# Patient Record
Sex: MALE | Race: BLACK OR AFRICAN AMERICAN | URBAN - METROPOLITAN AREA
[De-identification: names, ages, dates, MRNs, and addresses within clinical notes are randomized per-mention and may not be internally consistent; named-entity substitution may affect disease eponyms.]

---

## 2022-08-26 ENCOUNTER — OFFICE VISIT (OUTPATIENT)
Dept: URBAN - METROPOLITAN AREA CLINIC 51 | Facility: CLINIC | Age: 22
End: 2022-08-26
Payer: COMMERCIAL

## 2022-08-26 DIAGNOSIS — R51.9 HEADACHE: Primary | ICD-10-CM

## 2022-08-26 DIAGNOSIS — H43.313 VITREOUS MEMBRANES AND STRANDS, BILATERAL: ICD-10-CM

## 2022-08-26 DIAGNOSIS — H52.31 ANISOMETROPIA: ICD-10-CM

## 2022-08-26 DIAGNOSIS — H40.013 OPEN ANGLE WITH BORDERLINE FINDINGS, LOW RISK, BILATERAL: ICD-10-CM

## 2022-08-26 PROCEDURE — 99204 OFFICE O/P NEW MOD 45 MIN: CPT | Performed by: OPTOMETRIST

## 2022-08-26 PROCEDURE — 92134 CPTRZ OPH DX IMG PST SGM RTA: CPT | Performed by: OPTOMETRIST

## 2022-08-26 ASSESSMENT — INTRAOCULAR PRESSURE
OD: 14
OS: 17

## 2022-08-26 ASSESSMENT — KERATOMETRY
OS: 40.52
OD: 40.34

## 2022-08-26 ASSESSMENT — VISUAL ACUITY
OS: 20/20
OD: 20/25

## 2022-08-26 NOTE — IMPRESSION/PLAN
Impression: Headache: R51.9. *Pt reports he is getting tested for minor stroke *here to rule out any ocular etiology *Hx of anisometropia OD>>>OS Plan: A comprehensive eye examination today revealed no abnormal clinical findings today. Pupillary reflexes are normal, anterior segment examination is normal with no corneal staining, thinning, edema, infiltrate. The anterior chambers in both eyes are clear with no cells or flare and no synechiae or iris atrophy. Lenses are clear in both eyes and fundus examination revealed glaucoma suspicion for optic nerves, normal appearing macula and retinal vasculature and peripheral retina. Optical coherence tomography revealed normal macular morphology OU.

## 2022-08-26 NOTE — IMPRESSION/PLAN
Impression: Anisometropia: H52.31.
*most likely with refractive amblyopia Plan: Current specs are new.  Will recheck at glaucoma work up to determine high myopic shift

## 2022-08-26 NOTE — IMPRESSION/PLAN
Impression: Open angle with borderline findings, low risk, bilateral: H40.013. *IOPs today 14mmHg OD and 17mmHg OS without medication Plan: Based on Michael Valle 74 appearance. Pt discussion regarding diagnosis. Exp'd that it is a symptomless disease. Return to clinic for work up including HVF24-2, IOP recheck, Pachymetry and OCT RNFL next available . Will review test findings and treatment plan at follow up visit. No gtts given at this time. Pt denies any FOHx.

## 2022-10-17 ENCOUNTER — OFFICE VISIT (OUTPATIENT)
Dept: URBAN - METROPOLITAN AREA CLINIC 51 | Facility: CLINIC | Age: 22
End: 2022-10-17
Payer: COMMERCIAL

## 2022-10-17 DIAGNOSIS — H40.013 OPEN ANGLE WITH BORDERLINE FINDINGS, LOW RISK, BILATERAL: Primary | ICD-10-CM

## 2022-10-17 PROCEDURE — 92083 EXTENDED VISUAL FIELD XM: CPT | Performed by: OPTOMETRIST

## 2022-10-17 PROCEDURE — 76514 ECHO EXAM OF EYE THICKNESS: CPT | Performed by: OPTOMETRIST

## 2022-10-17 PROCEDURE — 92133 CPTRZD OPH DX IMG PST SGM ON: CPT | Performed by: OPTOMETRIST

## 2022-10-17 PROCEDURE — 99213 OFFICE O/P EST LOW 20 MIN: CPT | Performed by: OPTOMETRIST

## 2022-10-17 ASSESSMENT — INTRAOCULAR PRESSURE
OS: 18
OD: 18

## 2022-10-17 NOTE — IMPRESSION/PLAN
Impression: Open angle with borderline findings, low risk, bilateral: H40.013. *IOPs today 18mmHg OU without medication *Pachymetry (10/17/2022) 595/531 *OCT RNFL (10/17/2022) OD: 76um ; abnormal sup thinning OS: 81um ; abnormal sup thinning *HVF 24-2 (10/17/2022) Full OU Plan: I counseled the patient regarding the following: Glaucoma suspects do not usually need to be treated but do need close follow up monitoring for early signs of glaucoma damage. Some glaucoma suspects require treatment, and the same medical options for glaucoma eye drops and pills are utilized. Laser procedures and intraocular surgery are usually reserved for patients with uncontrolled glaucoma. Expectations: Glaucoma is usually diagnosed when the following three criteria are present: elevated intraocular pressure, optic nerve damage, and visual field loss. When some of these criteria are missing, it can be challenging to make the diagnosis of glaucoma. Glaucoma suspects may also include individuals with other risk factors such as a family history of glaucoma, temporary elevations of IO when taking steroid medications, pseudoexfoliation syndrome, pigmentary dispersion syndrome, thin central corneal thickness, and optic disc hemorrhages. Check IOP, OCT RNFL and HVF annually at [facility] [place].